# Patient Record
Sex: FEMALE | Race: WHITE | NOT HISPANIC OR LATINO | URBAN - METROPOLITAN AREA
[De-identification: names, ages, dates, MRNs, and addresses within clinical notes are randomized per-mention and may not be internally consistent; named-entity substitution may affect disease eponyms.]

---

## 2017-02-23 ENCOUNTER — AMBULATORY - HEALTHEAST (OUTPATIENT)
Dept: LAB | Facility: CLINIC | Age: 37
End: 2017-02-23

## 2017-05-17 ENCOUNTER — HOME CARE/HOSPICE - HEALTHEAST (OUTPATIENT)
Dept: HOME HEALTH SERVICES | Facility: HOME HEALTH | Age: 37
End: 2017-05-17

## 2017-05-19 ENCOUNTER — HOME CARE/HOSPICE - HEALTHEAST (OUTPATIENT)
Dept: HOME HEALTH SERVICES | Facility: HOME HEALTH | Age: 37
End: 2017-05-19

## 2018-01-30 ENCOUNTER — AMBULATORY - HEALTHEAST (OUTPATIENT)
Dept: NURSING | Facility: CLINIC | Age: 38
End: 2018-01-30

## 2018-01-30 DIAGNOSIS — Z23 NEED FOR VACCINATION: ICD-10-CM

## 2018-02-19 ENCOUNTER — OFFICE VISIT - HEALTHEAST (OUTPATIENT)
Dept: FAMILY MEDICINE | Facility: CLINIC | Age: 38
End: 2018-02-19

## 2018-02-19 DIAGNOSIS — Z13.21 SCREENING FOR ENDOCRINE, NUTRITIONAL, METABOLIC AND IMMUNITY DISORDER: ICD-10-CM

## 2018-02-19 DIAGNOSIS — Z13.228 SCREENING FOR ENDOCRINE, NUTRITIONAL, METABOLIC AND IMMUNITY DISORDER: ICD-10-CM

## 2018-02-19 DIAGNOSIS — Z13.29 SCREENING FOR ENDOCRINE, NUTRITIONAL, METABOLIC AND IMMUNITY DISORDER: ICD-10-CM

## 2018-02-19 DIAGNOSIS — Z13.0 SCREENING FOR ENDOCRINE, NUTRITIONAL, METABOLIC AND IMMUNITY DISORDER: ICD-10-CM

## 2018-02-19 DIAGNOSIS — Z00.00 VISIT FOR PREVENTIVE HEALTH EXAMINATION: ICD-10-CM

## 2018-02-19 DIAGNOSIS — Z12.4 PAPANICOLAOU SMEAR FOR CERVICAL CANCER SCREENING: ICD-10-CM

## 2018-02-19 LAB
ALBUMIN SERPL-MCNC: 4.2 G/DL (ref 3.5–5)
ALP SERPL-CCNC: 101 U/L (ref 45–120)
ALT SERPL W P-5'-P-CCNC: 13 U/L (ref 0–45)
ANION GAP SERPL CALCULATED.3IONS-SCNC: 8 MMOL/L (ref 5–18)
AST SERPL W P-5'-P-CCNC: 14 U/L (ref 0–40)
BILIRUB SERPL-MCNC: 0.5 MG/DL (ref 0–1)
BUN SERPL-MCNC: 14 MG/DL (ref 8–22)
CALCIUM SERPL-MCNC: 9.4 MG/DL (ref 8.5–10.5)
CHLORIDE BLD-SCNC: 106 MMOL/L (ref 98–107)
CHOLEST SERPL-MCNC: 219 MG/DL
CO2 SERPL-SCNC: 27 MMOL/L (ref 22–31)
CREAT SERPL-MCNC: 0.77 MG/DL (ref 0.6–1.1)
ERYTHROCYTE [DISTWIDTH] IN BLOOD BY AUTOMATED COUNT: 12.2 % (ref 11–14.5)
FASTING STATUS PATIENT QL REPORTED: YES
GFR SERPL CREATININE-BSD FRML MDRD: >60 ML/MIN/1.73M2
GLUCOSE BLD-MCNC: 88 MG/DL (ref 70–125)
HCT VFR BLD AUTO: 41.8 % (ref 35–47)
HDLC SERPL-MCNC: 69 MG/DL
HGB BLD-MCNC: 14.1 G/DL (ref 12–16)
LDLC SERPL CALC-MCNC: 139 MG/DL
MCH RBC QN AUTO: 29.5 PG (ref 27–34)
MCHC RBC AUTO-ENTMCNC: 33.7 G/DL (ref 32–36)
MCV RBC AUTO: 87 FL (ref 80–100)
PLATELET # BLD AUTO: 178 THOU/UL (ref 140–440)
PMV BLD AUTO: 8.5 FL (ref 7–10)
POTASSIUM BLD-SCNC: 4.2 MMOL/L (ref 3.5–5)
PROT SERPL-MCNC: 7.2 G/DL (ref 6–8)
RBC # BLD AUTO: 4.79 MILL/UL (ref 3.8–5.4)
SODIUM SERPL-SCNC: 141 MMOL/L (ref 136–145)
TRIGL SERPL-MCNC: 53 MG/DL
TSH SERPL DL<=0.005 MIU/L-ACNC: 1.59 UIU/ML (ref 0.3–5)
WBC: 4.2 THOU/UL (ref 4–11)

## 2018-02-19 ASSESSMENT — MIFFLIN-ST. JEOR: SCORE: 1300.45

## 2018-02-20 LAB
HPV SOURCE: NORMAL
HUMAN PAPILLOMA VIRUS 16 DNA: NEGATIVE
HUMAN PAPILLOMA VIRUS 18 DNA: NEGATIVE
HUMAN PAPILLOMA VIRUS FINAL DIAGNOSIS: NORMAL
HUMAN PAPILLOMA VIRUS OTHER HR: NEGATIVE
SPECIMEN DESCRIPTION: NORMAL

## 2018-02-22 LAB
BKR LAB AP ABNORMAL BLEEDING: NO
BKR LAB AP BIRTH CONTROL/HORMONES: NORMAL
BKR LAB AP CERVICAL APPEARANCE: NORMAL
BKR LAB AP GYN ADEQUACY: NORMAL
BKR LAB AP GYN INTERPRETATION: NORMAL
BKR LAB AP HPV REFLEX: NORMAL
BKR LAB AP LMP: NORMAL
BKR LAB AP PATIENT STATUS: NORMAL
BKR LAB AP PREVIOUS ABNORMAL: NORMAL
BKR LAB AP PREVIOUS NORMAL: 2016
HIGH RISK?: NO
PATH REPORT.COMMENTS IMP SPEC: NORMAL
RESULT FLAG (HE HISTORICAL CONVERSION): NORMAL

## 2019-02-26 ENCOUNTER — AMBULATORY - HEALTHEAST (OUTPATIENT)
Dept: NURSING | Facility: CLINIC | Age: 39
End: 2019-02-26

## 2019-05-14 ENCOUNTER — OFFICE VISIT - HEALTHEAST (OUTPATIENT)
Dept: FAMILY MEDICINE | Facility: CLINIC | Age: 39
End: 2019-05-14

## 2019-05-14 DIAGNOSIS — R19.7 DIARRHEA OF PRESUMED INFECTIOUS ORIGIN: ICD-10-CM

## 2019-05-22 ENCOUNTER — OFFICE VISIT - HEALTHEAST (OUTPATIENT)
Dept: FAMILY MEDICINE | Facility: CLINIC | Age: 39
End: 2019-05-22

## 2019-05-22 DIAGNOSIS — R19.7 DIARRHEA, UNSPECIFIED TYPE: ICD-10-CM

## 2019-05-22 ASSESSMENT — MIFFLIN-ST. JEOR: SCORE: 1295.91

## 2019-11-07 ENCOUNTER — OFFICE VISIT - HEALTHEAST (OUTPATIENT)
Dept: FAMILY MEDICINE | Facility: CLINIC | Age: 39
End: 2019-11-07

## 2019-11-07 DIAGNOSIS — M25.511 ACUTE PAIN OF RIGHT SHOULDER: ICD-10-CM

## 2019-11-07 DIAGNOSIS — Z23 NEED FOR INFLUENZA VACCINATION: ICD-10-CM

## 2019-11-07 DIAGNOSIS — R22.1 LUMP IN NECK: ICD-10-CM

## 2019-11-07 RX ORDER — MULTIPLE VITAMINS W/ MINERALS TAB 9MG-400MCG
1 TAB ORAL DAILY
Status: SHIPPED | COMMUNITY
Start: 2019-11-07

## 2019-12-20 ENCOUNTER — OFFICE VISIT - HEALTHEAST (OUTPATIENT)
Dept: PHYSICAL THERAPY | Facility: REHABILITATION | Age: 39
End: 2019-12-20

## 2019-12-20 DIAGNOSIS — M25.50: ICD-10-CM

## 2019-12-20 DIAGNOSIS — M25.511 ACUTE PAIN OF RIGHT SHOULDER: ICD-10-CM

## 2019-12-20 DIAGNOSIS — R29.898 WEAKNESS OF SHOULDER: ICD-10-CM

## 2019-12-20 DIAGNOSIS — M25.611 DECREASED ROM OF RIGHT SHOULDER: ICD-10-CM

## 2020-01-16 ENCOUNTER — COMMUNICATION - HEALTHEAST (OUTPATIENT)
Dept: FAMILY MEDICINE | Facility: CLINIC | Age: 40
End: 2020-01-16

## 2020-01-16 ENCOUNTER — OFFICE VISIT - HEALTHEAST (OUTPATIENT)
Dept: FAMILY MEDICINE | Facility: CLINIC | Age: 40
End: 2020-01-16

## 2020-01-16 DIAGNOSIS — Z13.29 SCREENING FOR ENDOCRINE, NUTRITIONAL, METABOLIC AND IMMUNITY DISORDER: ICD-10-CM

## 2020-01-16 DIAGNOSIS — Z11.4 SCREENING FOR HIV WITHOUT PRESENCE OF RISK FACTORS: ICD-10-CM

## 2020-01-16 DIAGNOSIS — Z00.00 VISIT FOR PREVENTIVE HEALTH EXAMINATION: ICD-10-CM

## 2020-01-16 DIAGNOSIS — Z13.21 SCREENING FOR ENDOCRINE, NUTRITIONAL, METABOLIC AND IMMUNITY DISORDER: ICD-10-CM

## 2020-01-16 DIAGNOSIS — Z13.228 SCREENING FOR ENDOCRINE, NUTRITIONAL, METABOLIC AND IMMUNITY DISORDER: ICD-10-CM

## 2020-01-16 DIAGNOSIS — Z13.0 SCREENING FOR ENDOCRINE, NUTRITIONAL, METABOLIC AND IMMUNITY DISORDER: ICD-10-CM

## 2020-01-16 LAB
ALBUMIN SERPL-MCNC: 4.3 G/DL (ref 3.5–5)
ALP SERPL-CCNC: 60 U/L (ref 45–120)
ALT SERPL W P-5'-P-CCNC: 14 U/L (ref 0–45)
ANION GAP SERPL CALCULATED.3IONS-SCNC: 9 MMOL/L (ref 5–18)
AST SERPL W P-5'-P-CCNC: 14 U/L (ref 0–40)
BILIRUB SERPL-MCNC: 0.8 MG/DL (ref 0–1)
BUN SERPL-MCNC: 11 MG/DL (ref 8–22)
CALCIUM SERPL-MCNC: 9 MG/DL (ref 8.5–10.5)
CHLORIDE BLD-SCNC: 106 MMOL/L (ref 98–107)
CHOLEST SERPL-MCNC: 168 MG/DL
CO2 SERPL-SCNC: 26 MMOL/L (ref 22–31)
CREAT SERPL-MCNC: 0.9 MG/DL (ref 0.6–1.1)
FASTING STATUS PATIENT QL REPORTED: NORMAL
GFR SERPL CREATININE-BSD FRML MDRD: >60 ML/MIN/1.73M2
GLUCOSE BLD-MCNC: 80 MG/DL (ref 70–125)
HDLC SERPL-MCNC: 58 MG/DL
HIV 1+2 AB+HIV1 P24 AG SERPL QL IA: NEGATIVE
LDLC SERPL CALC-MCNC: 101 MG/DL
POTASSIUM BLD-SCNC: 4.1 MMOL/L (ref 3.5–5)
PROT SERPL-MCNC: 6.9 G/DL (ref 6–8)
SODIUM SERPL-SCNC: 141 MMOL/L (ref 136–145)
TRIGL SERPL-MCNC: 44 MG/DL

## 2020-01-16 ASSESSMENT — MIFFLIN-ST. JEOR: SCORE: 1299.87

## 2020-07-08 ENCOUNTER — RECORDS - HEALTHEAST (OUTPATIENT)
Dept: ADMINISTRATIVE | Facility: OTHER | Age: 40
End: 2020-07-08

## 2021-05-28 NOTE — PROGRESS NOTES
Assessment & Plan  1. Diarrhea of presumed infectious origin  Likely infective.  She has had improvement today and has not had a loose stool for the last 4 hours.  It is likely that she will resolve on her own.  I advised over-the-counter medications including Pepto-Bismol and loperamide.  Advised to not use them if she had has black or bloody stools, fever or severe abdominal pain.  Advised that she be seen if she has any of those symptoms.  If the loose stools continue, would consider a work-up for celiac's disease or any other cause of loose stools.  She appears well-hydrated I advised that she eat bland foods and continue to drink plenty of water.    Carmel Gray MD    Subjective  Chief Complaint:  Diarrhea (pt states diarrhea for 5x days, really terrible this morning. nothing has change in regards to diet pretty healthy )    HPI:   Payal Coello is a 38 y.o. female who presents due to a loose stools.  She has had loose stools for 5 days.  Each day she has more than 6 stools per day.  It is not watery but it is very loose and soft.  She has not had any blood or black stools.  She does not have any abdominal pain except at the time of having the stool.  She has a normal appetite.  She has been eating normal food and taking normal water.  She does not have any sick contacts.  She does not have any recent travel out of the [place].  She does not have any fevers, chills, rash or any other symptoms.  She has not any had any changes in her diet.  She does not note any relationship to her diet for example no increased stools with dairy or with wheat-containing food.  Before the last 5 days she went every 2-3 days.   Solid, normal, no straining    Allergies:  has No Known Allergies.    SH/FH:  Social History and Family History reviewed and updated.   Tobacco Status:  She  reports that she has never smoked. She has never used smokeless tobacco.    Review of Systems:    Constitutional: No Weight Change, No  Fever, No Chills   ENT/Mouth: No Swallowing Difficulty   Cardiovascular: No Chest Pain  Respiratory: No Cough  Gastrointestinal: No Nausea, No Vomiting,  Diarrhea, No Constipation, No Pain,   Genitourinary: No Dysuria   Musculoskeletal: No Arthralgias  Skin: No Skin Lesions  Neuro: No Weakness,    Objective  Vitals:    05/14/19 1449   BP: 110/70   Patient Site: Left Arm   Patient Position: Sitting   Cuff Size: Adult Regular   Pulse: 80   Resp: 20   Weight: 130 lb 4 oz (59.1 kg)       Physical Exam:  GENERAL: Alert, well-appearing, in no acute distress.   PSYCH: Pleasant mood, affect appropriate.  Good eye contact.  SKIN: No apparent lesions  HEAD: Normocephalic, atraumatic  EYES: Conjunctiva pink, sclera white  CV: Regular rate and rhythm without murmurs, rubs or gallops.   RESP: No increased work of breathing.  ABDOMEN: No abdominal distension. Abdomen soft, nontender to palpation without guarding. No palpable organomegaly, masses or hernias  Well hydrated

## 2021-05-29 NOTE — PATIENT INSTRUCTIONS - HE
1. Take probiotic every day for the next 2-4 weeks  2. Take antiobiotic if diarrhea continues (>5 stools per day)  3. If severe pain or blood in the stool you should be seen  4. If loose stools continue past 1 month, we will do additional lab work  5. Pepto-Bismuth or Loperamide (over the counter) for temporary help

## 2021-05-29 NOTE — PROGRESS NOTES
Assessment & Plan  1. Diarrhea, unspecified type  Patient has subacute diarrhea.  The symptoms have improved after starting probiotics for the last 48 hours.  I discussed with her that it would be reasonable to try empiric antibiotics if the diarrhea comes back at greater than 5 stools per day.  I recommended Cipro 2 times a day for 3 days if she starts to have stools more than 5/day.  She should also take her probiotic with this.  If the loose stools continue for greater than 2 more weeks, 4 weeks total, I would do further work-up for chronic diarrhea.  This would include TSH, CBC, CRP, ESR, BMP, total protein and albumin and stool occult blood.  She has no symptoms currently to suggest systemic illness.  She was advised to return to the clinic if she has severe abdominal pain, or blood in the stool.    - ciprofloxacin HCl (CIPRO) 500 MG tablet; Take 1 tablet (500 mg total) by mouth 2 (two) times a day for 3 days.  Dispense: 6 tablet; Refill: 0    Carmel Gray MD    Subjective  Chief Complaint:  Diarrhea (x 2 weeks )    HPI:   Payal Coello is a 39 y.o. female who presents for continued loose stool.  She was seen about a week ago; at that time she complained of having loose stools more than 6 times per day.  Since the last visit she has continued to have loose stools.  Prior to the last 2 days, the stools were at least 6/day, and awoke her in the morning.  She did not have any black or bloody stools, or any abdominal pain.  She has had no nausea or vomiting.  She has had no fevers or chills.  She has continued to have a normal appetite and normal p.o. intake.  The last 2 days her stools have improved.  She had one loose stool on each day.  This did not wake her in the morning.  At her baseline she usually has a bowel movement every 2 to 3 days.  She started taking probiotics 2 days ago.  She has not made any changes to her diet.  There are no other sick family members.      Allergies:  has No Known  "Allergies.    SH/FH:  Social History and Family History reviewed and updated.   Tobacco Status:  She  reports that she has never smoked. She has never used smokeless tobacco.    Review of Systems:    No weight loss, no fevers or chills  No palpitations  No abdominal pain, no black or bloody stools  No joint pain or swelling  No oral ulcers  No dysuria  No fatigue    Objective  Vitals:    05/22/19 1011   BP: 98/70   Patient Site: Left Arm   Patient Position: Sitting   Cuff Size: Adult Regular   Pulse: 91   Weight: 133 lb (60.3 kg)   Height: 5' 7\" (1.702 m)       Physical Exam:  GENERAL: Alert, well-appearing, in no acute distress.   PSYCH: Pleasant mood, affect appropriate.  Good eye contact.  SKIN: No apparent lesions  HEAD: Normocephalic  EYES: Conjunctiva pink, sclera white,   NECK:No palpable thyromegaly  CV: Regular rate and rhythm without murmurs, rubs or gallops. No peripheral edema  RESP: No increased work of breathing.  Lung sounds bilateral, clear, symmetric excursion.   ABDOMEN: No abdominal distension. Abdomen soft, nontender to palpation without guarding. No palpable organomegaly, masses or hernias    "

## 2021-05-31 VITALS — BODY MASS INDEX: 21.03 KG/M2 | WEIGHT: 134 LBS | HEIGHT: 67 IN

## 2021-06-02 VITALS — BODY MASS INDEX: 20.88 KG/M2 | WEIGHT: 133 LBS | HEIGHT: 67 IN

## 2021-06-02 VITALS — BODY MASS INDEX: 20.4 KG/M2 | WEIGHT: 130.25 LBS

## 2021-06-03 VITALS
DIASTOLIC BLOOD PRESSURE: 69 MMHG | OXYGEN SATURATION: 98 % | HEART RATE: 79 BPM | SYSTOLIC BLOOD PRESSURE: 120 MMHG | WEIGHT: 134 LBS | BODY MASS INDEX: 20.99 KG/M2

## 2021-06-03 NOTE — PROGRESS NOTES
OFFICE VISIT - FAMILY MEDICINE     ASSESSMENT AND PLAN     1. Lump in neck     2. Need for influenza vaccination  Influenza, Seasonal Quad, PF =/> 6months   3. Acute pain of right shoulder  Ambulatory referral to PT/OT   Right neck lump  Differential diagnosis discussed included soft tissue swelling, lymph node etc., seems to slowly improving, will continue to monitor on follow-up in a couple of weeks if not fully resolved consider ultrasound at that point.  Right shoulder pain with rotator cuff tendinitis, referral for PT.    CHIEF COMPLAINT   Mass (Rt. side; not painful; soft-marble size - noticed about 3 weeks ago.); Flu Vaccine; and Shoulder Pain (Rt.- started in 3/2019 after a long drive with rt. arm -backwards position. )    HPI   Payal Coello is an an otherwise healthy 39 y.o. female  who presents in clinic today with concern for new found lump felt on the right side of her neck. The lump was first noticed about 4-weeks-ago when she was scratching her neck. She concurrently had a bug bite to the lower part of her head near where the lump is felt in the neck. She described the lump as soft, with no drainage, non-erythematous, and non-painful. She feels like over the last week the mass has been decreasing in size because it is becoming more difficult to find. She denies any recent unintentional weight loss, fevers, chills, or sickness.    In addition, Payal has been dealing with right shoulder pain. This pain stated in late March to early April when she took a road trip about 8 hours and she repetitively reached out to the back of the car and felt like she strained her shoulder. No trauma noted. She describes the pain as achy and being in her should joint. She has not taken any medication for the pain. She denies pain at rest or swelling.    Her shoulder continues to bug her when she does certain motions with her right arm such as brushing her hair, putting clothes on and playing tennis.         Review of  Systems As per HPI, otherwise negative.    OBJECTIVE   /69 (Patient Site: Left Arm, Patient Position: Sitting, Cuff Size: Adult Regular)   Pulse 79   Wt 134 lb (60.8 kg) Comment: shoes on  LMP 10/04/2019   SpO2 98%   BMI 20.99 kg/m    Physical Exam   Constitutional: She is oriented to person, place, and time. She appears well-developed and well-nourished.   HENT:   Head: Normocephalic and atraumatic.   Neck: Normal range of motion. Neck supple. No JVD present. No tracheal deviation present. No thyromegaly present.   Cardiovascular: Normal rate, regular rhythm, normal heart sounds and intact distal pulses. Exam reveals no gallop and no friction rub.   No murmur heard.  Pulmonary/Chest: Effort normal and breath sounds normal. No respiratory distress. She has no wheezes. She has no rales. Musculoskeletal:         General: Tenderness (right upper posterior shoulder.) present. No edema.     Lymphadenopathy:     She has no cervical adenopathy.   Neurological: She is alert and oriented to person, place, and time. Coordination normal.   Psychiatric: She has a normal mood and affect. Judgment and thought content normal.       PFSH     Family History   Problem Relation Age of Onset     Breast cancer Other      Diabetes Other      Arthritis Other      Social History     Socioeconomic History     Marital status:      Spouse name: Not on file     Number of children: 2     Years of education: Not on file     Highest education level: Not on file   Occupational History     Not on file   Social Needs     Financial resource strain: Not on file     Food insecurity:     Worry: Not on file     Inability: Not on file     Transportation needs:     Medical: Not on file     Non-medical: Not on file   Tobacco Use     Smoking status: Never Smoker     Smokeless tobacco: Never Used   Substance and Sexual Activity     Alcohol use: Yes     Comment: Occasional     Drug use: No     Sexual activity: Yes     Partners: Male     Birth  control/protection: None   Lifestyle     Physical activity:     Days per week: Not on file     Minutes per session: Not on file     Stress: Not on file   Relationships     Social connections:     Talks on phone: Not on file     Gets together: Not on file     Attends Jainism service: Not on file     Active member of club or organization: Not on file     Attends meetings of clubs or organizations: Not on file     Relationship status: Not on file     Intimate partner violence:     Fear of current or ex partner: Not on file     Emotionally abused: Not on file     Physically abused: Not on file     Forced sexual activity: Not on file   Other Topics Concern     Not on file   Social History Narrative     Not on file     Relevant history was reviewed with the patient today, unless noted in HPI, nothing is pertinent for this visit.  [facility]     Patient Active Problem List    Diagnosis Date Noted     Vaginal delivery 05/16/2017     Past Surgical History:   Procedure Laterality Date     APPENDECTOMY  1993     RHINOPLASTY  1994     TONSILLECTOMY  1990       RESULTS/CONSULTS (Lab/Rad)   No results found for this or any previous visit (from the past 168 hour(s)).  No results found.  MEDICATIONS     Current Outpatient Medications on File Prior to Visit   Medication Sig Dispense Refill     docosahexanoic acid/epa (FISH OIL ORAL) Take by mouth.       multivitamin with minerals (THERA-M) 9 mg iron-400 mcg Tab tablet Take 1 tablet by mouth daily.       [DISCONTINUED] Lactobac no.41/Bifidobact no.7 (PROBIOTIC-10 ORAL) Take by mouth.       [DISCONTINUED] prenatal vitamin iron-folic acid 27mg-0.8mg (PRENATAL S) 27 mg iron- 800 mcg Tab tablet Take 1 tablet by mouth daily.       No current facility-administered medications on file prior to visit.        HEALTH MAINTENANCE / SCREENING   PHQ-2 Total Score: 0 (5/14/2019  2:49 PM)  , No data recorded,No data recorded  Immunization History   Administered Date(s) Administered     Influenza,  Seasonal, Inj PF IIV3 12/15/2014     Influenza, inj, historic,unspecified 10/21/2011, 10/24/2013     Influenza,seasonal quad, PF, =/> 6months 12/05/2015, 01/30/2018, 02/26/2019, 11/07/2019     Influenza,seasonal, Inj IIV3 10/21/2011, 10/24/2013     Tdap 10/24/2013     Health Maintenance   Topic     HIV SCREENING      PREVENTIVE CARE VISIT      PAP SMEAR      HPV TEST      ADVANCE CARE PLANNING      TDAP ADULT ONE TIME DOSE      INFLUENZA VACCINE RULE BASED      TD 18+ HE      I was present with the medical student (Ernesto Mrucia,[id number]) who participated in the service and in the documentation of the note. I have verified the history and personally performed the physical exam and medical decision making. I agree with the assessment and plan of care as documented in the note above.    Fe Fu MD  Family Medicine, [facility]     This note was dictated using a voice recognition software.  Any grammatical or context distortion are unintentional and inherent to the software.

## 2021-06-04 VITALS
HEIGHT: 67 IN | BODY MASS INDEX: 20.88 KG/M2 | DIASTOLIC BLOOD PRESSURE: 65 MMHG | OXYGEN SATURATION: 98 % | WEIGHT: 133 LBS | SYSTOLIC BLOOD PRESSURE: 95 MMHG | HEART RATE: 77 BPM

## 2021-06-04 NOTE — PROGRESS NOTES
[facility]   Shoulder Initial Evaluation    Patient Name: Payal Coello  PRECAUTIONS/RESTRICTIONS:  None  Date of evaluation: 12/20/2019  Referral Diagnosis: Acute Right Shoulder Pain  Referring provider: Fe Fu *  Visit Diagnosis:     ICD-10-CM    1. Acute pain of right shoulder M25.511    2. Decreased ROM of right shoulder M25.611    3. Weakness of shoulder R29.898    4. Tenderness of joint R29.898        Assessment:      Skilled PT is required to modulate pain , increase ROM, flexibility, strength, posture and function  through modalities, manual therapy, exercise and education  Pt. is appropriate for skilled PT intervention as outlined in the Plan of Care (POC).  Pt. is a good candidate for skilled PT services to improve pain levels and function.    Goals:  Pt. will demonstrate/verbalize independence in self-management of condition in : 6 weeks  Pt. will be independent with home exercise program in : 6 weeks  Patient will return to: sport;other activity;in 6 weeks  other activity: play racquet sport and perform hand stand without pain in 12 weeks.  Patient will reach / maintain arm movement: overhead;with less difficulty;in 6 weeks;for home chores  for other activity: reach into cupboard with a load with greater ease.    Patient will decrease : SPADI score;by _ points;for improved quality of function;for improved quality of life;in 6 weeks;Comment  by ___ points: 8  Comment: Initial score 66.15  Pt will: be able to reach forward with 30# child into car seat into middle seat position of car with greater ease in 6 weeks  Pt will: be able to reach up behind back to fasten bra or nabil overhead garments, wash hair with greater ease in 6 weeks  Pt will: be able to tolerate nursing position with greater ease in 6 weeks  Pt will: be painfree at rest in 6 weeks      Patient's expectations/goals are realistic.    Barriers to Learning or Achieving Goals:  No Barriers.       Plan / Patient  Instructions:        Plan of Care:   Communication with: Referral Source  Patient Related Instruction: Nature of Condition;Treatment plan and rationale;Self Care instruction;Basis of treatment;Precautions;Next steps;Expected outcome  Times per Week: 2  Number of Weeks: 6  Number of Visits: 12  Discharge Planning: INdependent HEP and self-management of condition, adequate progress toward goal or plateau in progress.  Precautions / Restrictions : None  Therapeutic Exercise: ROM;Stretching;Strengthening  Neuromuscular Reeducation: kinesio tape;posture  Manual Therapy: soft tissue mobilization;myofascial release;joint mobilization  Modalities: iontophoresis;ultrasound  Equipment: theraband;other  Equipment: kinesiotape      POC and pathology of condition were reviewed with patient.  Pt. is in agreement with the Plan of Care  A Home Exercise Program (HEP) was initiated today.  Pt. was instructed in exercises by PT and patient was given a handout with detailed instructions.    The goals and plan of care were established in collaboration with the patient.    Plan for next visit: See 1-2x/week for 5 more visits then reassess progress/needs.  If beneficial continue 1x/week for 6 more visits.  Next:  Assess response to kinesiotape, add CFM to supraspinatus tendon, progressively add:  Wand ER, row/pull down, ER at left SL, all 4's rock and left UE lift, wall rubs, wall push-ups, horizontal abduction/prone extension, IR with theraband.  Instruct in proper posture at computer.  .   Subjective:       Social information:      Occupation:  Consultant/Homemaker   Work Status:  20 hours/week   Equipment Available: None    History of Present Illness:    Payal Coello is a 39 y.o. female who presents to therapy today with chief complaints of acute right shoulder pain, onset during a 9 hour car trip as she was driving and repetitively reaching into the back seat to address her children's needs, combining shoulder ext/IR motions.  Onset  was fairly suddenly.  Denies UE numb/tingle.  States to carry stress in her upper traps/shoulders.  Pt demo's signs and sx consistent with supraspinatus tendonitis.     Pain Rating current:  7  Pain rating at best: 3  Pain rating at worst: 9  Pain description: burning and full feeling    Functional limitations are described as occurring with:   reach overhead into cupboards with a load, reach forward to get 30# son into car seat, reach behind to fasten bra, don overhead garments, arm elevation to wash hair, tolerate nursing baby, playing racquet ball and performing hand stands.    Patient reports benefit from:  massage, move/mobilize the arm       Objective:      Note: Items left blank indicates the item was not performed or not indicated at the time of the evaluation.    Patient Outcome Measures :    Shoulder Pain and Disability Index (SPADI) in %: 66.15     Scores range from 0-100%, where a score of 0% represents minimal pain and maximal function. The minimal clincically important difference is a score reduction of 10%.    Shoulder Examination  1. Acute pain of right shoulder     2. Decreased ROM of right shoulder     3. Weakness of shoulder     4. Tenderness of joint       Precautions/Restrictions: None    Involved side: Right    Posture Observation:   Standing:  Left shoulder/scap/iliac crest high, cerv protraction, decreased L lordosis       Cervical Clearing: Normal    Flexibility:     Palpation:  Mild tenderness:  Supraspinatus>>subscapularis tendon and bilat mild infraspinatus tendon    Joint Assessment:  Sternoclavicular Joint Assessment:  Not tested  Acromioclavicular Joint Assessment: Not tested  Scapulothoracic Joint Assessment: Hypomobile.  Glenohumeral Joint Assessment:Posterior Capsule tightness., Inferior Capsule tightness.    Shoulder/Elbow ROM   Date: 12/20/2019     Shoulder and Elbow ROM ( )   AROM in degrees AROM in degrees AROM in degrees    Right Left Right Left Right Left   Shoulder Flexion  "(0-180 ) 136 painful arc and tight and end range pain 177       Shoulder Abduction (0-180 ) 150 end range pain 150       Shoulder Extension (0-60 ) 22 tight>pain 55       Shoulder ER (0-90 ) 50 pain 75       Shoulder IR (0-70 ) T11-12 pain and tight T6-7       Shoulder IR/Ext         Elbow Flexion (150 )         Elbow Extension (0 )          PROM in degrees PROM in degrees PROM in degrees    Right Left Right Left Right Left   Shoulder Flexion (0-180 ) WNL mild  end rang3e WNL       Shoulder Abduction (0-180 ) WNL mild end range pain WNL       Shoulder Extension (0-60 )         Shoulder ER (0-90 ) WNL WNL       Shoulder IR (0-70 ) WNL end range pain and tightness WNL       Elbow Flexion (150 )         Elbow Extension (0 )           Shoulder/Elbow Strength tested isometrically  Date: 12/20/2019     Shoulder/Elbow Strength (/5)  Manual Muscle Test (MMT) MMT MMT MMT    Right Left Right Left Right Left   Shoulder Flexion Mild pain        Supraspinatus No pain 5        Shoulder Abduction pain        Shoulder Extension No pain        Shoulder External Rotation 5 Mild pain        Shoulder Internal Rotation 5 Mild pain        Elbow Flexion 5        Elbow Extension 5        Other:         Other:             Shoulder Special Tests     Impingement Cluster Right (+/-) Left (+/-) Rotator Cuff Tests Right (+/-) Left (+/-)   Rai-Kole + - Drop Arm Sign     Painful Arc + - Hornblowers     Infraspinatus Test   ERLS     AC Tests Right (+/-) Left (+/-) IRLS     Active Compression   Labral Tests Right (+/-) Left (+/-)   Crossbody Adduction   Biceps Load Test II     AC Resisted Extension   Jerk Test     GH Instability Tests Right (+/-) Left (+/-) Celia Test     Sulcus Sign   Biceps Tests Right (+/-) Left (+/-)   Apprehension   Speed     Relocation   Nadira saenz     Other:   Other:     Other:   Other:       Today's HEP:  Exercises:  Exercise #1: Codmans all motions, 10x ech-H  Comment #1: Wand extension/canoe paddle, d5d\"-H  Exercise " "#2: Posterior capsule stretch, 1x5\"-H  Comment #2: Supine wand bench press into flexion with relaxation at end range, 5x5\"-H  Exercise #3: Cerv/scap retraction, 10\"x5-H  Comment #3: Instructed to use CP, 2-3x/day for 10-15 minutes for 2 days  Exercise #4: Kinesiotape for painful shoulder with movement, skin prep applied, wear/care instructed, HO issued.    Tolerated exercises well with good understanding and execution.  Skin condition good prior to tape application.  Patient reported immediate sense of benefit with tape in support to shoulder.    Treatment Today  TREATMENT MINUTES COMMENTS   Evaluation 30 Shoulder   Self-care/ Home management     Manual therapy     Neuromuscular Re-education     Therapeutic Activity     Therapeutic Exercises 25 See flow sheet   Gait training     Modality__________________                Total 55    Blank areas are intentional and mean the treatment did not include these items.     PT Evaluation Code: (Please list factors)  Patient History/Comorbidities: see above  Examination: see above  Clinical Presentation: stable/uncomplicate  Clinical Decision Making: low    Patient History/  Comorbidities Examination  (body structures and functions, activity limitations, and/or participation restrictions) Clinical Presentation Clinical Decision Making (Complexity)   No documented Comorbidities or personal factors 1-2 Elements Stable and/or uncomplicated Low   1-2 documented comorbidities or personal factor 3 Elements Evolving clinical presentation with changing characteristics Moderate   3-4 documented comorbidities or personal factors 4 or more Unstable and unpredictable High              Tawana Jones  12/20/2019  10:56 AM       [facility] Discharge Summary  Patient Name: Payal Coello  Date: 2/20/2020  Referral Diagnosis: Acute Right Shoulder Pain  Referring provider: Fe Fu *  Visit Diagnosis:   1. Acute pain of right shoulder     2. Decreased ROM of right " shoulder     3. Weakness of shoulder     4. Tenderness of joint         Goals:  Status unknown/unmet; patient came for initial evaluation and canceled 5 outstanding appointments.  Pt. will demonstrate/verbalize independence in self-management of condition in : 6 weeks  Pt. will be independent with home exercise program in : 6 weeks  Patient will return to: sport;other activity;in 6 weeks  other activity: play racquet sport and perform hand stand without pain in 12 weeks.  Patient will reach / maintain arm movement: overhead;with less difficulty;in 6 weeks;for home chores  for other activity: reach into cupboard with a load with greater ease.    Patient will decrease : SPADI score;by _ points;for improved quality of function;for improved quality of life;in 6 weeks;Comment  by ___ points: 8  Comment: Initial score 66.15  Pt will: be able to reach forward with 30# child into car seat into middle seat position of car with greater ease in 6 weeks  Pt will: be able to reach up behind back to fasten bra or nabil overhead garments, wash hair with greater ease in 6 weeks  Pt will: be able to tolerate nursing position with greater ease in 6 weeks  Pt will: be painfree at rest in 6 weeks      Patient was seen for 1 visit on 12/20/2019 with 5 canceled appointments.  The patient attended therapy initially, but did not finish the therapy sessions prescribed.  Goals were not fully achieved. Explanation for goals not achieved: patient came for initial evaluation only and canceled 5 outstanding appointments.  Patient received a home program shoulder ROM, capsule stretch and postural strengthening  The patient discontinued therapy.  The patient was issued kinesiotape and instructed in proper usage.    Therapy will be discontinued at this time.  The patient will need a new referral to resume.    Thank you for your referral.  Tawana Jones  2/20/2020  9:24 AM

## 2021-06-04 NOTE — PATIENT INSTRUCTIONS - HE
"Exercises:  Exercise #1: Codmans all motions, 10x ech-H  Comment #1: Wand extension/canoe paddle, d5d\"-H  Exercise #2: Posterior capsule stretch, 1x5\"-H  Comment #2: Supine wand bench press into flexion with relaxation at end range, 5x5\"-H  Exercise #3: Cerv/scap retraction, 10\"x5-H  Comment #3: Instructed to use CP, 2-3x/day for 10-15 minutes for 2 days  Exercise #4: Kinesiotape for painful shoulder with movement, skin prep applied, wear/care instructed, HO issued.      "
Left arm;

## 2021-06-05 NOTE — PROGRESS NOTES
FEMALE ADULT PREVENTIVE EXAM    CHIEF COMPLAINT:  Female preventive exam.    SUBJECTIVE:  Payal Coello is a 39 y.o. female who presents for her routine physical exam.    Patient would like to address the following concerns today: Overall doing fine, no major concern.  Mild skin eruption, planning to follow with dermatologist, sister has a history of melanoma.  Weight has been stable.      GYNE HISTORY  Menses: yes  Sexually Active: yes  Contraception: no  Last Pap: 2018  Abnormal Pap: no  Vaginal Discharge: no      She  has a past medical history of Asthma and Concussion (1982).    Lab Results   Component Value Date    WBC 4.2 02/19/2018    HGB 14.1 02/19/2018    HCT 41.8 02/19/2018    MCV 87 02/19/2018     02/19/2018     01/16/2020    K 4.1 01/16/2020    BUN 11 01/16/2020     Lab Results   Component Value Date    CHOL 168 01/16/2020    HDL 58 01/16/2020    LDLCALC 101 01/16/2020    TRIG 44 01/16/2020     Lab Results   Component Value Date    TSH 1.59 02/19/2018     BP Readings from Last 3 Encounters:   01/16/20 95/65   11/07/19 120/69   05/22/19 98/70       Surgeries:    Past Surgical History:   Procedure Laterality Date     APPENDECTOMY  1993     RHINOPLASTY  1994     TONSILLECTOMY  1990       Family History:  Her family history includes Arthritis in an other family member; Breast cancer in an other family member; Diabetes in an other family member.    Social History:  She  reports that she has never smoked. She has never used smokeless tobacco. She reports current alcohol use. She reports that she does not use drugs.    Medications:    Current Outpatient Medications:      docosahexanoic acid/epa (FISH OIL ORAL), Take by mouth., Disp: , Rfl:      multivitamin with minerals (THERA-M) 9 mg iron-400 mcg Tab tablet, Take 1 tablet by mouth daily., Disp: , Rfl:   HELD MEDICATIONS: None.    Allergies:  No latex allergies.  No Known Allergies         RISK BEHAVIOR & HEALTH HABITS  Self Breast Exam:  yes.  Regular Exercise: yes.  Balanced diet: yes.  Seat Belt Use: yes  Calcium intake/Osteoporosis prevention: yes.    Guns: NA  Sun Screen: YES  Dental Care: YES    REVIEW OF SYSTEMS:  Complete head to toe review of systems is otherwise negative except as above.    OBJECTIVE:  VITAL SIGNS:    Vitals:    01/16/20 0934   BP: 95/65   Pulse: 77   SpO2: 98%     GENERAL:  Patient alert, in no acute distress.  EYES: PERRLA. Extraoccular movements intact, pupils equal, reactive to light and accommodation.  Normal conjunctiva and lids.    ENT:  Hearing grossly normal.  Normal appearance to ears and nose.  Bilateral TM s, external canals, oropharynx normal. Normal lips, gums and teeth.    NECK:  Supple, without thyromegaly or mass, no adenopathies.  RESP:  Clear to auscultation without crackles, wheezes or distress.  Normal respiratory effort.   CV:  Regular rate and rhythm without murmurs, rubs or gallops.  Normal pedal pulses.  No varicosities or edema.  ABDOMEN:  Soft, non-tender, without hepatosplenomegaly, masses, or hernias.   BREASTS:  deferred    :  deferred  Rectal: deferred  LYMPHATIC: Normal palpation of neck.  No lymphadenopathy.  No bruising.  NEURO:  CN II-XII intact, motor & sensory function all intact.  DTR and reflexes normal.  PSYCHIATRIC:  Alert & oriented with normal mood and affect.  Good judgment and insight.  SKIN:  Normal inspection and palpation.  MUSCULOSKELETAL: Normal gait and station.  - Spine / Ribs / Pelvis: Normal inspection, ROM, stability and strength: Spine, Head, Neck, Upper and Lower Extremities.    ASSESSMENT & PLAN  Payal was seen today for annual exam.    Diagnoses and all orders for this visit:    Visit for preventive health examination    Screening for endocrine, nutritional, metabolic and immunity disorder  -     Comprehensive Metabolic Panel  -     Lipid [place]    Screening for HIV without presence of risk factors  -     HIV Antigen/Antibody Screening  [place]      General  Immunizations reviewed and updated .  Alcohol use, safety and moderation discussed.  Recommended adequate calcium, vitamin D intake/osteoporosis prevention.  Discussed colon cancer screening at age 50, 45 if -American.  Diet and exercise reviewed, including goal of aerobic exercise 30-90 minutes most days of the week, moderation of portions sizes, avoiding eating out and fast food and increase in fruits and vegetables.  Discussed safe sex practices.    Discussed & recommended seat belt (& motorcycle helmet) use.  Discussed & recommended smoke detector.  Discussed sun protection.  Discussed weight management.  Discussed self breast exam, screening mammogram timing.  I have had an Advance Directives discussion with the patient.  The following high BMI interventions were performed this visit: encouragement to exercise    Fe Fu MD

## 2021-06-16 NOTE — PROGRESS NOTES
FEMALE ADULT PREVENTIVE EXAM    CHIEF COMPLAINT:  Female preventive exam.    SUBJECTIVE:  Payal Coello is a 37 y.o. female who presents for her routine physical exam.    Patient would like to address the following concerns today: Overall has been doing fine, she has a 20 months and 4 years old son at home, keeping her obesity.  No prior Pap smear done since she delivered a in July 2016.  Has had one done at [facility].  Currently breast-feeding.  GYNE HISTORY  Menses: none  Sexually Active: yes  Contraception: condom  Last Pap: 2016? unsure  Abnormal Pap: no  Vaginal Discharge: no      She  has a past medical history of Asthma and Concussion (1982).    Lab Results   Component Value Date    WBC 4.2 02/19/2018    HGB 14.1 02/19/2018    HCT 41.8 02/19/2018    MCV 87 02/19/2018     02/19/2018     02/19/2018    K 4.2 02/19/2018    BUN 14 02/19/2018     Lab Results   Component Value Date    CHOL 219 (H) 02/19/2018    HDL 69 02/19/2018    LDLCALC 139 (H) 02/19/2018    TRIG 53 02/19/2018     Lab Results   Component Value Date    TSH 1.59 02/19/2018     BP Readings from Last 3 Encounters:   02/19/18 102/60   05/19/17 102/82   05/18/17 125/67       Surgeries:    Past Surgical History:   Procedure Laterality Date     APPENDECTOMY  1993     RHINOPLASTY  1994     TONSILLECTOMY  1990       Family History:  Her family history includes Arthritis in her other; Breast cancer in her other; Diabetes in her other.    Social History:  She  reports that she has never smoked. She has never used smokeless tobacco. She reports that she drinks alcohol. She reports that she does not use illicit drugs.    Medications:    Current Outpatient Prescriptions:      prenatal vitamin iron-folic acid 27mg-0.8mg (PRENATAL S) 27 mg iron- 800 mcg Tab tablet, Take 1 tablet by mouth daily., Disp: , Rfl:   HELD MEDICATIONS: None.    Allergies:  No latex allergies.  No Known Allergies         RISK BEHAVIOR & HEALTH HABITS  Self Breast Exam:  yes.  Regular Exercise: yes.  Balanced diet: yes.  Seat Belt Use: yes  Calcium intake/Osteoporosis prevention: yes.    Guns: NA  Sun Screen: YES  Dental Care: YES    REVIEW OF SYSTEMS:  Complete head to toe review of systems is otherwise negative except as above.    OBJECTIVE:  VITAL SIGNS:    Vitals:    02/19/18 1016   BP: 102/60   Pulse: 72   Resp: 13   Temp: 97.9  F (36.6  C)     GENERAL:  Patient alert, in no acute distress.  EYES: PERRLA. Extraoccular movements intact, pupils equal, reactive to light and accommodation.  Normal conjunctiva and lids.    ENT:  Hearing grossly normal.  Normal appearance to ears and nose.  Bilateral TM s, external canals, oropharynx normal. Normal lips, gums and teeth.    NECK:  Supple, without thyromegaly or mass, no adenopathies.  RESP:  Clear to auscultation without crackles, wheezes or distress.  Normal respiratory effort.   CV:  Regular rate and rhythm without murmurs, rubs or gallops.  Normal pedal pulses.  No varicosities or edema.  ABDOMEN:  Soft, non-tender, without hepatosplenomegaly, masses, or hernias.   BREASTS:  deferred    :  (chaperoned by Jorje ,female CA)  External genitalia:  Normal without lesions.  Urethra: Normal appearing  Vagina: Normal without discharge  Cervix: normal  Uterus:  Nontender, mobile, without masses  Ovaries: Normal without masses  Rectal: No visible external lesions  LYMPHATIC: Normal palpation of neck.  No lymphadenopathy.  No bruising.  NEURO:  CN II-XII intact, motor & sensory function all intact.  DTR and reflexes normal.  PSYCHIATRIC:  Alert & oriented with normal mood and affect.  Good judgment and insight.  SKIN:  Normal inspection and palpation.  MUSCULOSKELETAL: Normal gait and station.  - Spine / Ribs / Pelvis: Normal inspection, ROM, stability and strength: Spine, Head, Neck, Upper and Lower Extremities.    ASSESSMENT & PLAN  Payal was seen today for annual exam and concerns.    Diagnoses and all orders for this visit:    Visit for  preventive health examination  -     Comprehensive Metabolic Panel  -     Thyroid Cascade  -     HM2(CBC w/o Differential)  -     Lipid [place] FASTING    Screening for endocrine, nutritional, metabolic and immunity disorder    Papanicolaou smear for cervical cancer screening  -     Gynecologic Cytology (PAP Smear)      General  Immunizations reviewed and updated .  Alcohol use, safety and moderation discussed.  Recommended adequate calcium, vitamin D intake/osteoporosis prevention.  Discussed colon cancer screening at age 50, 45 if -American.  Diet and exercise reviewed, including goal of aerobic exercise 30-90 minutes most days of the week, moderation of portions sizes, avoiding eating out and fast food and increase in fruits and vegetables.  Discussed safe sex practices.    Discussed & recommended seat belt (& motorcycle helmet) use.  Discussed & recommended smoke detector.  Discussed sun protection.  Discussed weight management.  Discussed self breast exam, screening mammogram timing.  I have had an Advance Directives discussion with the patient.    Fe Fu MD

## 2021-06-17 NOTE — PATIENT INSTRUCTIONS - HE
Patient Instructions by Carmel Gray MD at 5/14/2019  2:40 PM     Author: Carmel Gray MD Service: -- Author Type: Physician    Filed: 5/14/2019  3:42 PM Encounter Date: 5/14/2019 Status: Addendum    : Carmel Gray MD (Physician)    Related Notes: Original Note by Carmel Gray MD (Physician) filed at 5/14/2019  3:39 PM       1. Over the counter:   -Pepto-Bismo (causes dark stools)  -Loperamide (Imodium)-- but this causes constipation-- stronger at stopping the diarrhea.      2. Return to the clinic if you have fever, severe abdominal pain, or blood    3.  Avoid high fiber foods until you are back to your normal    Patient Education     Treating Diarrhea    Diarrhea happens when you have loose, watery, or frequent bowel movements. It is a common problem with many causes. Most cases of diarrhea clear up on their own. But certain cases may need treatment. Be sure to see your healthcare provider if your symptoms do not improve within a few days.  Getting relief  Treatment of diarrhea depends on its cause. Diarrhea caused by bacterial or parasite infection is often treated with antibiotics. Diarrhea caused by other factors, such as a stomach virus, often improves with simple home treatment. The tips below may also help relieve your symptoms.    Drink plenty of fluids. This helps prevent too much fluid loss (dehydration). Water, clear soups, and electrolyte solutions are good choices. Avoid alcohol, coffee, tea, and milk. These can irritate your intestines and make symptoms worse.    Suck on ice chips if drinking makes you queasy.    Return to your normal diet slowly. You may want to eat bland foods at first, such as rice and toast. Also, you may need to avoid certain foods for a while, such as dairy products. These can make symptoms worse. Ask your healthcare provider if there are any other foods you should avoid.    If you were prescribed antibiotics, take them as directed.    Do not  take anti-diarrhea medicines without asking your healthcare provider first.  Call your healthcare provider   Call your healthcare provider if you have any of the following:     A fever of 100.4 F (38.0 C) or higher, or as directed by your healthcare provider    Severe pain    Worsening diarrhea or diarrhea for more than 2 days    Bloody vomit or stool    Signs of dehydration (dizziness, dry mouth and tongue, rapid pulse, dark urine)  Date Last Reviewed: 7/1/2016 2000-2017 [employer]. [address] [place] [address]. All rights reserved. This information is not intended as a substitute for professional medical care. Always follow your healthcare professional's instructions.

## 2021-06-17 NOTE — PATIENT INSTRUCTIONS - HE
Patient Instructions by Fe Fu MD at 1/16/2020  9:20 AM     Author: Fe Fu MD Service: -- Author Type: Physician    Filed: 1/16/2020  7:09 PM Encounter Date: 1/16/2020 Status: Signed    : Fe Fu MD (Physician)           Patient Education     Prevention Guidelines, Women Ages 40 to 49  Screening tests and vaccines are an important part of managing your health. A screening test is done to find diseases in people who don't have any symptoms. The goal is to find a disease early so lifestyle changes and checkups can reduce the risk of disease. Or the goal may be to detect it early to treat it most effectively. Screening tests are not used to diagnose a disease. But they are used to see if more testing is needed. Health counseling is important, too. Below are guidelines for these, for women ages 40 to 49. Talk with your healthcare provider to make sure youre up-to-date on what you need.  Screening Who needs it How often   Type 2 diabetes or prediabetes All women beginning at age 45 and women without symptoms at any age who are overweight or obese and have 1 or more additional risk factors for diabetes At least every 3 years1   Type 2 diabetes or prediabetes All women diagnosed with gestational diabetes Lifelong testing every 3 years   Type 2 diabetes All women with prediabetes Every year   Alcohol misuse All women in this age group At routine exams   Blood pressure All women in this age group Yearly checkup if your blood pressure is normal  Normal blood pressure is less than 120/80 mm Hg  If your blood pressure reading is higher than normal, follow the advice of your healthcare provider   Breast cancer All women at average risk in this age group Screening with a mammogram can start at age 40.2 Talk with your healthcare provider to help you decide when to start screening. At age 45 start yearly mammograms.3    Cervical cancer All women in this age group, except  women who have had a complete hysterectomy Pap test every 3 years or Pap test plus human papilloma virus (HPV) test every 5 years   Colorectal cancer Women age 45 years and older at average risk  Multiple tests are available and are used at different times. Possible tests include:    Flexible sigmoidoscopy every 5 years, or    Colonoscopy every 10 years, or    CT colonography (virtual colonoscopy) every 5 years, or    Yearly fecal occult blood test, or    Yearly fecal immunochemical test every year, or    Stool DNA test, every 3 years  If you choose a test other than a colonoscopy and have an abnormal test result, you will need to follow-up with a colonoscopy. Screening advice varies among expert groups. Talk with your healthcare provider about which tests are best for you.  Some people should be screened using a different schedule because of their personal or family health history. Talk with your healthcare provider about your health history.   Chlamydia Women at increased risk for infection At routine exams if you're at risk or have symptoms   Depression All women in this age group At routine exams   Gonorrhea Sexually active women at increased risk for infection At routine exams   Hepatitis C Anyone at increased risk; 1 time for those born between 1945 and 1965 At routine exams   High cholesterol or triglycerides All women ages 45 and older who are at risk for coronary artery disease; younger women, talk with your healthcare provider At least every 5 years   HIV All women At routine exams. Those with risk factors for HIV should be tested at least annually.   Obesity All women in this age group At routine exams   Syphilis Women at increased risk for infection-talk with your healthcare provider At routine exams   Tuberculosis Women at increased risk for infection-talk with your healthcare provider Ask your healthcare provider   Vision All women in this age group Complete exam at age 40 and eye exams every 2 to 4  years. If you have a chronic disease, ask your healthcare provider how often you should have your eyes examined.4   Vaccine Who needs it How often   Chickenpox (varicella) All women in this age group who have no record of this infection or vaccine 2 doses; the second dose should be given at least 4 weeks after the first dose   Hepatitis A Women at increased risk for infection-talk with your healthcare provider 2 doses given 6 months apart   Hepatitis B Women at increased risk for infection-talk with your healthcare provider 3 doses over 6 months; second dose should be given 1 month after the first dose; the third dose should be given at least 2 months after the second dose and at least 4 months after the first dose   Haemophilus influenzae Type B (HIB) Women at increased risk 1 to 3 doses   Influenza (flu) All women in this age group Once a year   Measles, mumps, rubella (MMR) All women in this age group who have no record of these infections or vaccines 1 or 2 doses   Meningococcal Women at increased risk for infection-talk with your healthcare provider 1 or more doses   Pneumococcal conjugate vaccine (PCV13) and pneumococcal polysaccharide vaccine (PPSV23) Women at increased risk for infection-talk with your healthcare provider 1 or 2 doses   Tetanus/diphtheria/pertussis (Td/Tdap) booster All women in this age group A 1-time dose of Tdap instead of a Td booster after age 18, then Td every 10 years   Counseling Who needs it How often   BRCA gene mutation testing for breast and ovarian cancer susceptibility Women with increased risk for having gene mutation When your risk is known   Breast cancer and chemoprevention Women at high risk for breast cancer When your risk is known   Diet and exercise Women who are overweight or obese When diagnosed, and then at routine exams   Domestic violence Women at the age in which they are able to have children At routine exams   Sexually transmitted infection prevention Women at  increased risk for infection-talk with your healthcare provider At routine exams   Use of tobacco and the health effects it can cause All women in this age group Every exam   1 American Diabetes Association  2 American College of Obstetricians and Gynecologists   3 American Cancer Society  4 American Academy of Ophthalmology  Date Last Reviewed: 11/1/2017 2000-2019 [employer]. [address] [place] [address]. All rights reserved. This information is not intended as a substitute for professional medical care. Always follow your healthcare professional's instructions.

## 2021-07-14 PROBLEM — Z34.90 PREGNANT: Status: RESOLVED | Noted: 2017-05-16 | Resolved: 2017-05-16
